# Patient Record
Sex: FEMALE | Race: BLACK OR AFRICAN AMERICAN
[De-identification: names, ages, dates, MRNs, and addresses within clinical notes are randomized per-mention and may not be internally consistent; named-entity substitution may affect disease eponyms.]

---

## 2019-04-23 ENCOUNTER — HOSPITAL ENCOUNTER (EMERGENCY)
Dept: HOSPITAL 15 - ER | Age: 44
Discharge: LEFT BEFORE DISCHARGE | End: 2019-04-23
Payer: SELF-PAY

## 2019-04-23 VITALS — SYSTOLIC BLOOD PRESSURE: 121 MMHG | DIASTOLIC BLOOD PRESSURE: 75 MMHG

## 2019-04-23 VITALS — HEIGHT: 63 IN | BODY MASS INDEX: 33.66 KG/M2 | WEIGHT: 190 LBS
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
## 2019-04-23 DIAGNOSIS — S50.862A: Primary | ICD-10-CM
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
## 2024-03-15 ENCOUNTER — OFFICE (OUTPATIENT)
Dept: URBAN - METROPOLITAN AREA CLINIC 6 | Facility: CLINIC | Age: 49
End: 2024-03-15

## 2024-03-15 VITALS
DIASTOLIC BLOOD PRESSURE: 72 MMHG | WEIGHT: 190 LBS | TEMPERATURE: 98.4 F | SYSTOLIC BLOOD PRESSURE: 116 MMHG | HEART RATE: 73 BPM | HEIGHT: 63 IN

## 2024-03-15 DIAGNOSIS — Z12.11 SCREENING FOR COLONIC NEOPLASIA: ICD-10-CM

## 2024-03-15 DIAGNOSIS — K21.9 GERD: ICD-10-CM

## 2024-03-15 DIAGNOSIS — I82.409 DVT (DEEP VENOUS THROMBOSIS): ICD-10-CM

## 2024-03-15 DIAGNOSIS — K59.00 CONSTIPATION: ICD-10-CM

## 2024-03-15 PROCEDURE — 99204 OFFICE O/P NEW MOD 45 MIN: CPT | Performed by: INTERNAL MEDICINE

## 2024-03-15 NOTE — SERVICEHPINOTES
Pt with h/o GERD/heartburn on omeprazole >5 years, used to be on 40mg but decreased to 20mg last year.  Used to take it daily but now taking prn.  Sx used to be much more frequent, even on meds.  But now gets heartburn about once/month.  She has decreased stress, better diet.  1 cup coffee daily.  Minimizing GERD triggers, changed diet.  Also dx'd with DVT/PE in late Nov at [facility] was told to lose weight so changed diet, lost 10# since end of Nov.  Currently on Eliquis for about 6 months.  She had EGD 2-3 years ago by Dr Palmer which was NL.  Current hematologist is Dr Gage who is requesting EGD and colonoscopy to r/o malignancy as a cause of the DVT.  Never been dx'd with cancer.  Last colonoscopy ~15 years ago in [place] for rectal bleeding only hemorrhoids found.  She has mild blood on toilet paper once-twice weekly, ongoing.  No h/o anemia.  Anal pain if frequent BMs.  Constipation is rare, once-twice/month increased fiber intake and that's helped.
br   She has had US/CTs in the hospital
br

br 6/16/23 - FOBT negbr
3/31/23 - CBC NL Hb 11.5 CMP NL Chol 228  TSH NL

## 2024-04-17 ENCOUNTER — AMBULATORY SURGICAL CENTER (OUTPATIENT)
Dept: URBAN - METROPOLITAN AREA SURGERY 5 | Facility: SURGERY | Age: 49
End: 2024-04-17

## 2024-04-17 VITALS
DIASTOLIC BLOOD PRESSURE: 72 MMHG | RESPIRATION RATE: 18 BRPM | TEMPERATURE: 97.5 F | SYSTOLIC BLOOD PRESSURE: 123 MMHG | HEART RATE: 90 BPM | OXYGEN SATURATION: 97 % | WEIGHT: 190 LBS | HEIGHT: 63 IN

## 2024-04-17 DIAGNOSIS — K57.30 DIVERTICULOSIS OF LARGE INTESTINE WITHOUT PERFORATION OR ABS: ICD-10-CM

## 2024-04-17 DIAGNOSIS — K63.5 POLYP OF COLON: ICD-10-CM

## 2024-04-17 DIAGNOSIS — I82.409 ACUTE EMBOLISM AND THROMBOSIS OF UNSPECIFIED DEEP VEINS OF U: ICD-10-CM

## 2024-04-17 DIAGNOSIS — K59.00 CONSTIPATION, UNSPECIFIED: ICD-10-CM

## 2024-04-17 DIAGNOSIS — K64.4 RESIDUAL HEMORRHOIDAL SKIN TAGS: ICD-10-CM

## 2024-04-17 PROCEDURE — 43235 EGD DIAGNOSTIC BRUSH WASH: CPT | Performed by: INTERNAL MEDICINE

## 2024-04-17 PROCEDURE — 45380 COLONOSCOPY AND BIOPSY: CPT | Performed by: INTERNAL MEDICINE

## 2024-04-18 LAB — PATHOLOGY REPORT: (no result)
